# Patient Record
(demographics unavailable — no encounter records)

---

## 2025-04-24 NOTE — HISTORY OF PRESENT ILLNESS
[Pain Location] : pain [] : right knee [Lumbar] : lumbar region [Worsening] : worsening [___ wks] : [unfilled] week(s) ago [8] : a minimum pain level of 8/10 [10] : a maximum pain level of 10/10 [de-identified] :  Patients present for a follow up visit for Spondylolisthesis at L4-L5 level.  Patient states for about 8 weeks she has been experiencing an increase in right knee pain. She notes that occasionally notes pain in her right leg. She denies injury and accidents. She states that one day she was eating dinner, and she got up and the increase pain in the right knee began. Patient denies symptoms of radiculopathy in the left leg. Patient currently takes Eliquis.

## 2025-04-24 NOTE — DISCUSSION/SUMMARY
[PRN] : PRN [Medication Risks Reviewed] : Medication risks reviewed [de-identified] :  I have discussed the underlying pathophysiology of the patient's condition. I highly recommend that the patient starts a course of physical therapy at this time. I have provided the patient with a detailed prescription for physical therapy for both the right knee, lower back and neck. Patient should follow up with a knee specialist. Patient should follow up with Dr. Lockwood. Patient should follow up with me as needed.

## 2025-04-24 NOTE — PHYSICAL EXAM
[Antalgic] : antalgic [Wide-Based] : wide-based [Stooped] : stooped [Walker] : ambulates with walker [LE] : Sensory: Intact in bilateral lower extremities [Normal RLE] : Right Lower Extremity: No scars, rashes, lesions, ulcers, skin intact [Normal LLE] : Left Lower Extremity: No scars, rashes, lesions, ulcers, skin intact [Obese] : obese [Normal] : Oriented to person, place, and time, insight and judgement were intact and the affect was normal [de-identified] : Pain to palpation to the mid right thoracic costovertebral rib joint [de-identified] : There is tenderness palpation over the left flank.  Range of motion to the lumbar spine is limited in all planes.  There is negative JANELLE test bilaterally. [de-identified] :  AP & lateral Flexion and Extension X-Rays of the Lumbar spine taken 04/23/2025 shows significant scoliosis, grade 1 going grad 2 L4-5, which does not apear to move. There is poor flexion and extension effort. Compared to X-rays obtained shows no real change.

## 2025-04-24 NOTE — HISTORY OF PRESENT ILLNESS
[Pain Location] : pain [] : right knee [Lumbar] : lumbar region [Worsening] : worsening [___ wks] : [unfilled] week(s) ago [8] : a minimum pain level of 8/10 [10] : a maximum pain level of 10/10 [de-identified] :  Patients present for a follow up visit for Spondylolisthesis at L4-L5 level.  Patient states for about 8 weeks she has been experiencing an increase in right knee pain. She notes that occasionally notes pain in her right leg. She denies injury and accidents. She states that one day she was eating dinner, and she got up and the increase pain in the right knee began. Patient denies symptoms of radiculopathy in the left leg. Patient currently takes Eliquis.

## 2025-04-24 NOTE — ADDENDUM
[FreeTextEntry1] :  I, Ivaniaviry Sharmad, acted solely as a scribe for Dr. Tj Weaver on this date 04/23/2025 .  All medical records entries made by the Scribe were at my Dr. Tj Weaver direction and personally dictated by me on 04/23/2025. I have reviewed the chart and agree that the record accurately reflects my personal performance of the history, physical exam, assessment and plan. I have also personally directed, reviewed, and agreed with the chart.

## 2025-04-24 NOTE — PHYSICAL EXAM
[Antalgic] : antalgic [Wide-Based] : wide-based [Stooped] : stooped [Walker] : ambulates with walker [LE] : Sensory: Intact in bilateral lower extremities [Normal RLE] : Right Lower Extremity: No scars, rashes, lesions, ulcers, skin intact [Normal LLE] : Left Lower Extremity: No scars, rashes, lesions, ulcers, skin intact [Obese] : obese [Normal] : Oriented to person, place, and time, insight and judgement were intact and the affect was normal [de-identified] : Pain to palpation to the mid right thoracic costovertebral rib joint [de-identified] : There is tenderness palpation over the left flank.  Range of motion to the lumbar spine is limited in all planes.  There is negative JANELLE test bilaterally. [de-identified] :  AP & lateral Flexion and Extension X-Rays of the Lumbar spine taken 04/23/2025 shows significant scoliosis, grade 1 going grad 2 L4-5, which does not apear to move. There is poor flexion and extension effort. Compared to X-rays obtained shows no real change.

## 2025-04-24 NOTE — DISCUSSION/SUMMARY
[PRN] : PRN [Medication Risks Reviewed] : Medication risks reviewed [de-identified] :  I have discussed the underlying pathophysiology of the patient's condition. I highly recommend that the patient starts a course of physical therapy at this time. I have provided the patient with a detailed prescription for physical therapy for both the right knee, lower back and neck. Patient should follow up with a knee specialist. Patient should follow up with Dr. Lockwood. Patient should follow up with me as needed.

## 2025-05-01 NOTE — HISTORY OF PRESENT ILLNESS
[de-identified] : Patient presents for evaluation of right knee pain that began about 2 months ago when getting up from a table.  They have had medially based knee pain since that time.  Pain is 4-5 out of 10 in severity and is worse with walking.  They are using a rollator.  They are on Eliquis and do have an allergy to steroids

## 2025-05-01 NOTE — DISCUSSION/SUMMARY
[de-identified] : SHAWN BERGERON  is an 83 year-old female who presents to the clinic with 8 weeks of right knee pain.  The symptoms began after a traumatic twisting injury. The patient initially tried OTC medications/NSAIDs with some relief, even though short lasting. Exercise therapy has had only temporary relief.  Radiographic findings show no obvious fracture or deformity,however patient has had continued pain and is unable to return to their usual activities. Given the history along with the physical exam findings of medial joint line pain and pain with deep squat, I am concerned about a possible meniscus tear. I discussed that radiographs show the bones well but do not show the soft tissues, such as ligaments, tendons and menisci well. At this point, the patient is quite debilitated by her  pain and is unable to return to her  activities of daily living such as walking. Given the persistent symptoms, I discussed with the patient that her should continue to avoid strenuous activities while we obtain an MRI to further evaluate for an internal derangement of the knee. The office will work to obtain the MRI.   I discussed with them that I often prescribe an anti-inflammatory that should be taken once a day with meals to decrease pain and expedite symptom relief. They should not take this while also taking Aleve (Naprosyn), Motrin/ Advil (Ibuprofen), Toradol (ketoralac). They must stop taking it if they develops stomach pain, increased bleeding or bruising and they should follow-up with their primary care doctor for routine blood work including kidney function to monitor its effect. While it Is not a habit-forming substance, it should only  be taken as needed and to discontinue use once symptoms have resolved.  They cannot take this medication will continue with over-the-counter medication instead   In the interim, the patient should continue to walk and take anti-inflammatory medications as directed if not medically contraindicated. They will schedule follow-up for in person review of the MRI results. They know to call the office or present to the ER if they develop worsening pain, swelling, numbness, tingling, inability to use the leg.   My cumulative time spent on this patients visit included: Preparation for the visit, review of the medical records, review of pertinent diagnostic studies, examination and counseling of the patient on the above diagnosis, treatment plan and prognosis, orders of diagnostic tests, medications and/or appropriate procedures and documentation in the medical records of todays visit.

## 2025-05-01 NOTE — PHYSICAL EXAM
1. Attention deficit hyperactivity disorder (ADHD), unspecified ADHD type        Recent visit notes reviewed, and patient stable on current regimen.   Prescription Drug Monitoring Program reviewed; no aberrant behavior identified, prescription authorized.  Med E-prescribed.      Orders Placed This Encounter   • amphetamine-dextroamphetamine (Adderall XR) 30 MG 24 hr capsule     Sig: Take 1 capsule by mouth daily. Due for follow up Jan 2023, call [phone] to schedule     Dispense:  30 capsule     Refill:  0   • amphetamine-dextroamphetamine (ADDERALL) 10 MG tablet     Sig: Take 1 tablet by mouth daily.     Dispense:  30 tablet     Refill:  0        [de-identified] : General Appearance / Station: Well developed, well nourished, in no acute distress  Orientation: Oriented to person, place, and time Gait & Station: Ambulates without assistive device Neurologic: Normal leg sensation  Cardiovascular: Warm extremity  Lymphatics: No lymphedema  Generalized Ligament Laxity: Normal  Stiffness: Normal   LUMBAR SPINE: Nontender  at lumbar spine Straight leg raise: Negative  Motor: 5/5 motor L2-S1 Sensation: Intact  L2-S1  RIGHT HIP: Range of motion: Painless  internal and external rotation of the hip. Strength: Within Normal Limits  Palpation: Nontender  at greater trochanter. Nontender  at SI joint Stinchfield: Negative  FADIR: Negative  JANELLE: Negative   SYMPTOMATIC RIGHT KNEE: Alignment: Neutral Skin: normal Effusion: none . Quadriceps: normal . Range of motion: symmetrical but painful . PF crepitus: 1+. PF apprehension: none . Patella / Patella Tendon: nontender . Lachman's: negative  Valgus @ 30: negative. Varus @ 30: negative. Posterior drawer: negative. Palpation: TENDER AT MEDIAL JOINT LINE. Meniscus signs: MEDIAL JOINT LINE   [de-identified] : Imaging: AP Pelvis show no significant hip joint space narrowing. There are no signs of fracture. The soft tissues appear unremarkable  Imaging: Standing 4 views of the right knee show right knee no significant joint space narrowing. There are no signs of fracture. There is no  knee effusion. The soft tissues appear unremarkable

## 2025-05-15 NOTE — HISTORY OF PRESENT ILLNESS
[de-identified] : Presents for follow-up of right knee pain.  Underwent an MRI and is here for MRI review.  Symptoms have overall improved but she still has difficulty with stairs there is 6 out of 10 in severity.  She has allergies to steroids

## 2025-05-15 NOTE — PHYSICAL EXAM
[de-identified] : General Appearance / Station: Well developed, well nourished, in no acute distress  Orientation: Oriented to person, place, and time Gait & Station: Ambulates with walker for assistive device Neurologic: Normal leg sensation  Cardiovascular: Warm extremity  Lymphatics: No lymphedema  Generalized Ligament Laxity: Normal  Stiffness: Normal  RIGHT HIP: Range of motion: Painless  internal and external rotation of the hip. Strength: Within Normal Limits  Palpation: Nontender  at greater trochanter. Nontender  at SI joint Stinchfield: Negative  FADIR: Negative  JANELLE: Negative   SYMPTOMATIC RIGHT KNEE: Alignment: Neutral Skin: normal Effusion: none . Quadriceps: normal . Range of motion: symmetrical but painful . PF crepitus: 1+. PF apprehension: none . Patella / Patella Tendon: nontender . Lachman's: negative  Valgus @ 30: negative. Varus @ 30: negative. Posterior drawer: negative. Palpation: TENDER AT MEDIAL JOINT LINE.  Lateral patellar facet Meniscus signs: MEDIAL JOINT LINE   [de-identified] : MRI from [facility] dated 5/6/2025 shows oblique tear in the body of the lateral meniscus and osteoarthritis of the patellofemoral joint with full-thickness cartilage loss of the apex of patellar and lateral patellar facet with subchondral bone marrow edema

## 2025-05-15 NOTE — DISCUSSION/SUMMARY
[de-identified] : I discussed nonoperative treatment options for their right knee patellofemoral arthritis. We discussed nonoperative treatments options including activity modification, therapy, bracing, nonsteroidal anti-inflammatory medications, and injections. The patient would like to continue with nonoperative treatment and would like to proceed with activity modification, physical therapy   I discussed with them that I often prescribe an anti-inflammatory that should be taken once a day with meals to decrease pain and expedite symptom relief. They should not take this while also taking Aleve (Naprosyn), Motrin/ Advil (Ibuprofen), Toradol (ketoralac). They must stop taking it if they develops stomach pain, increased bleeding or bruising and they should follow-up with their primary care doctor for routine blood work including kidney function to monitor its effect. While it Is not a habit-forming substance, it should only  be taken as needed and to discontinue use once symptoms have resolved.  She cannot take this medication will continue with her current medication regimen   My cumulative time spent on this patients visit included: Preparation for the visit, review of the medical records, review of pertinent diagnostic studies, examination and counseling of the patient on the above diagnosis, treatment plan and prognosis, orders of diagnostic tests, medications and/or appropriate procedures and documentation in the medical records of todays visit.